# Patient Record
Sex: FEMALE | Race: WHITE
[De-identification: names, ages, dates, MRNs, and addresses within clinical notes are randomized per-mention and may not be internally consistent; named-entity substitution may affect disease eponyms.]

---

## 2020-03-20 ENCOUNTER — HOSPITAL ENCOUNTER (OUTPATIENT)
Dept: HOSPITAL 95 - MOI US | Age: 85
Discharge: HOME | End: 2020-03-20
Attending: INTERNAL MEDICINE
Payer: MEDICARE

## 2020-03-20 DIAGNOSIS — R59.0: Primary | ICD-10-CM

## 2020-03-20 PROCEDURE — A4648 IMPLANTABLE TISSUE MARKER: HCPCS

## 2020-04-16 ENCOUNTER — HOSPITAL ENCOUNTER (OUTPATIENT)
Dept: HOSPITAL 95 - ORSCMMR | Age: 85
Discharge: HOME | End: 2020-04-16
Attending: SURGERY
Payer: MEDICARE

## 2020-04-16 VITALS — BODY MASS INDEX: 29.78 KG/M2 | WEIGHT: 201.06 LBS | HEIGHT: 69.02 IN

## 2020-04-16 DIAGNOSIS — R59.0: Primary | ICD-10-CM

## 2020-04-16 DIAGNOSIS — E78.5: ICD-10-CM

## 2020-04-16 DIAGNOSIS — Z79.899: ICD-10-CM

## 2020-04-16 DIAGNOSIS — I48.91: ICD-10-CM

## 2020-04-16 DIAGNOSIS — I10: ICD-10-CM

## 2020-04-16 DIAGNOSIS — E66.9: ICD-10-CM

## 2020-04-16 DIAGNOSIS — M06.9: ICD-10-CM

## 2020-04-16 DIAGNOSIS — G47.33: ICD-10-CM

## 2020-04-16 DIAGNOSIS — Z79.01: ICD-10-CM

## 2020-04-16 DIAGNOSIS — Z87.891: ICD-10-CM

## 2020-04-16 PROCEDURE — 07B50ZX EXCISION OF RIGHT AXILLARY LYMPHATIC, OPEN APPROACH, DIAGNOSTIC: ICD-10-PCS | Performed by: SURGERY

## 2020-04-16 NOTE — NUR
Ambulatory in Day Surgery.  History, Chart, Medications and Allergies reviewed
before start of procedure.Lungs clear T/O to Auscultation.  Patient confirms
NPO status and agrees with scheduled surgery.  Pre-Op teaching done. Pt
verbalizes understanding.  Patient States Post-Procedure ride home has been
arranged.

## 2020-04-28 ENCOUNTER — HOSPITAL ENCOUNTER (OUTPATIENT)
Dept: HOSPITAL 95 - LAB SHORT | Age: 85
Discharge: HOME | End: 2020-04-28
Attending: FAMILY MEDICINE
Payer: MEDICARE

## 2020-04-28 DIAGNOSIS — J02.9: Primary | ICD-10-CM

## 2020-05-14 ENCOUNTER — HOSPITAL ENCOUNTER (OUTPATIENT)
Dept: HOSPITAL 95 - LAB SHORT | Age: 85
Discharge: HOME | End: 2020-05-14
Attending: INTERNAL MEDICINE
Payer: MEDICARE

## 2020-05-14 DIAGNOSIS — R53.83: ICD-10-CM

## 2020-05-14 DIAGNOSIS — D50.9: Primary | ICD-10-CM

## 2020-05-14 DIAGNOSIS — R53.81: ICD-10-CM

## 2020-05-14 LAB
ALBUMIN SERPL BCP-MCNC: 2.6 G/DL (ref 3.4–5)
ALBUMIN/GLOB SERPL: 0.6 {RATIO} (ref 0.8–1.8)
ALT SERPL W P-5'-P-CCNC: 60 U/L (ref 12–78)
ANION GAP SERPL CALCULATED.4IONS-SCNC: 9 MMOL/L (ref 6–16)
AST SERPL W P-5'-P-CCNC: 42 U/L (ref 12–37)
BILIRUB SERPL-MCNC: 0.3 MG/DL (ref 0.1–1)
BUN SERPL-MCNC: 21 MG/DL (ref 8–24)
CALCIUM SERPL-MCNC: 8.5 MG/DL (ref 8.5–10.1)
CHLORIDE SERPL-SCNC: 103 MMOL/L (ref 98–108)
CO2 SERPL-SCNC: 25 MMOL/L (ref 21–32)
CREAT SERPL-MCNC: 0.82 MG/DL (ref 0.4–1)
GLOBULIN SER CALC-MCNC: 4.4 G/DL (ref 2.2–4)
GLUCOSE SERPL-MCNC: 184 MG/DL (ref 70–99)
POTASSIUM SERPL-SCNC: 4.1 MMOL/L (ref 3.5–5.5)
PROT SERPL-MCNC: 7 G/DL (ref 6.4–8.2)
SODIUM SERPL-SCNC: 137 MMOL/L (ref 136–145)

## 2021-03-11 ENCOUNTER — HOSPITAL ENCOUNTER (EMERGENCY)
Dept: HOSPITAL 95 - ER | Age: 86
Discharge: HOME | End: 2021-03-11
Payer: MEDICARE

## 2021-03-11 VITALS — HEIGHT: 66 IN | BODY MASS INDEX: 31.34 KG/M2 | WEIGHT: 195 LBS

## 2021-03-11 DIAGNOSIS — Z79.01: ICD-10-CM

## 2021-03-11 DIAGNOSIS — K59.09: Primary | ICD-10-CM

## 2021-03-11 DIAGNOSIS — Z79.899: ICD-10-CM

## 2021-03-11 DIAGNOSIS — K64.4: ICD-10-CM

## 2021-03-11 LAB
ALBUMIN SERPL BCP-MCNC: 2.9 G/DL (ref 3.4–5)
ALBUMIN/GLOB SERPL: 0.7 {RATIO} (ref 0.8–1.8)
ALT SERPL W P-5'-P-CCNC: 13 U/L (ref 12–78)
ANION GAP SERPL CALCULATED.4IONS-SCNC: 10 MMOL/L (ref 6–16)
AST SERPL W P-5'-P-CCNC: 20 U/L (ref 12–37)
BASOPHILS # BLD AUTO: 0.07 K/MM3 (ref 0–0.23)
BASOPHILS NFR BLD AUTO: 1 % (ref 0–2)
BILIRUB SERPL-MCNC: 0.5 MG/DL (ref 0.1–1)
BUN SERPL-MCNC: 35 MG/DL (ref 8–24)
CALCIUM SERPL-MCNC: 9 MG/DL (ref 8.5–10.1)
CHLORIDE SERPL-SCNC: 105 MMOL/L (ref 98–108)
CO2 SERPL-SCNC: 22 MMOL/L (ref 21–32)
CREAT SERPL-MCNC: 1.59 MG/DL (ref 0.4–1)
DEPRECATED RDW RBC AUTO: 63.9 FL (ref 35.1–46.3)
EOSINOPHIL # BLD AUTO: 0.15 K/MM3 (ref 0–0.68)
EOSINOPHIL NFR BLD AUTO: 2 % (ref 0–6)
ERYTHROCYTE [DISTWIDTH] IN BLOOD BY AUTOMATED COUNT: 19.7 % (ref 11.7–14.2)
GLOBULIN SER CALC-MCNC: 4.4 G/DL (ref 2.2–4)
GLUCOSE SERPL-MCNC: 127 MG/DL (ref 70–99)
HCT VFR BLD AUTO: 35.2 % (ref 33–51)
HGB BLD-MCNC: 10.8 G/DL (ref 11.5–16)
IMM GRANULOCYTES # BLD AUTO: 0.03 K/MM3 (ref 0–0.1)
IMM GRANULOCYTES NFR BLD AUTO: 0 % (ref 0–1)
LYMPHOCYTES # BLD AUTO: 1.75 K/MM3 (ref 0.84–5.2)
LYMPHOCYTES NFR BLD AUTO: 23 % (ref 21–46)
MCHC RBC AUTO-ENTMCNC: 30.7 G/DL (ref 31.5–36.5)
MCV RBC AUTO: 93 FL (ref 80–100)
MONOCYTES # BLD AUTO: 0.56 K/MM3 (ref 0.16–1.47)
MONOCYTES NFR BLD AUTO: 8 % (ref 4–13)
NEUTROPHILS # BLD AUTO: 4.94 K/MM3 (ref 1.96–9.15)
NEUTROPHILS NFR BLD AUTO: 66 % (ref 41–73)
NRBC # BLD AUTO: 0 K/MM3 (ref 0–0.02)
NRBC BLD AUTO-RTO: 0 /100 WBC (ref 0–0.2)
PLATELET # BLD AUTO: 300 K/MM3 (ref 150–400)
POTASSIUM SERPL-SCNC: 4.6 MMOL/L (ref 3.5–5.5)
PROT SERPL-MCNC: 7.3 G/DL (ref 6.4–8.2)
SODIUM SERPL-SCNC: 137 MMOL/L (ref 136–145)

## 2021-03-11 PROCEDURE — A9270 NON-COVERED ITEM OR SERVICE: HCPCS

## 2021-05-04 ENCOUNTER — HOSPITAL ENCOUNTER (OUTPATIENT)
Dept: HOSPITAL 95 - LAB SHORT | Age: 86
End: 2021-05-04
Attending: FAMILY MEDICINE
Payer: MEDICARE

## 2021-05-04 DIAGNOSIS — N39.0: Primary | ICD-10-CM

## 2021-05-10 ENCOUNTER — HOSPITAL ENCOUNTER (INPATIENT)
Dept: HOSPITAL 95 - ER | Age: 86
DRG: 871 | End: 2021-05-10
Attending: HOSPITALIST | Admitting: HOSPITALIST
Payer: MEDICARE

## 2021-05-10 VITALS — HEIGHT: 67 IN | BODY MASS INDEX: 27.8 KG/M2 | WEIGHT: 177.12 LBS

## 2021-05-10 DIAGNOSIS — Z91.048: ICD-10-CM

## 2021-05-10 DIAGNOSIS — A41.02: Primary | ICD-10-CM

## 2021-05-10 DIAGNOSIS — G92: ICD-10-CM

## 2021-05-10 DIAGNOSIS — I11.0: ICD-10-CM

## 2021-05-10 DIAGNOSIS — M06.9: ICD-10-CM

## 2021-05-10 DIAGNOSIS — N39.0: ICD-10-CM

## 2021-05-10 DIAGNOSIS — A41.81: ICD-10-CM

## 2021-05-10 DIAGNOSIS — E87.2: ICD-10-CM

## 2021-05-10 DIAGNOSIS — Z79.899: ICD-10-CM

## 2021-05-10 DIAGNOSIS — Z20.822: ICD-10-CM

## 2021-05-10 DIAGNOSIS — J69.0: ICD-10-CM

## 2021-05-10 DIAGNOSIS — Z85.3: ICD-10-CM

## 2021-05-10 DIAGNOSIS — Z51.5: ICD-10-CM

## 2021-05-10 DIAGNOSIS — E03.9: ICD-10-CM

## 2021-05-10 DIAGNOSIS — G47.30: ICD-10-CM

## 2021-05-10 DIAGNOSIS — Z66: ICD-10-CM

## 2021-05-10 DIAGNOSIS — K59.00: ICD-10-CM

## 2021-05-10 DIAGNOSIS — I48.91: ICD-10-CM

## 2021-05-10 DIAGNOSIS — N17.9: ICD-10-CM

## 2021-05-10 DIAGNOSIS — Z96.653: ICD-10-CM

## 2021-05-10 DIAGNOSIS — N18.30: ICD-10-CM

## 2021-05-10 DIAGNOSIS — Z87.891: ICD-10-CM

## 2021-05-10 DIAGNOSIS — Z98.890: ICD-10-CM

## 2021-05-10 DIAGNOSIS — R65.21: ICD-10-CM

## 2021-05-10 DIAGNOSIS — I50.42: ICD-10-CM

## 2021-05-10 LAB
ALBUMIN SERPL BCP-MCNC: 2.5 G/DL (ref 3.4–5)
ALBUMIN/GLOB SERPL: 0.5 {RATIO} (ref 0.8–1.8)
ALT SERPL W P-5'-P-CCNC: 18 U/L (ref 12–78)
ANION GAP SERPL CALCULATED.4IONS-SCNC: 21 MMOL/L (ref 6–16)
AST SERPL W P-5'-P-CCNC: 38 U/L (ref 12–37)
BASOPHILS # BLD: 0.33 K/MM3 (ref 0–0.23)
BASOPHILS NFR BLD: 3 % (ref 0–2)
BILIRUB SERPL-MCNC: 0.7 MG/DL (ref 0.1–1)
BILIRUB UR QL STRIP: (no result)
BUN SERPL-MCNC: 35 MG/DL (ref 8–24)
CALCIUM SERPL-MCNC: 8.8 MG/DL (ref 8.5–10.1)
CHLORIDE SERPL-SCNC: 106 MMOL/L (ref 98–108)
CO2 SERPL-SCNC: 14 MMOL/L (ref 21–32)
CREAT SERPL-MCNC: 2.46 MG/DL (ref 0.4–1)
DEPRECATED RDW RBC AUTO: 58.7 FL (ref 35.1–46.3)
EOSINOPHIL # BLD: 0 K/MM3 (ref 0–0.68)
EOSINOPHIL NFR BLD: 0 % (ref 0–6)
ERYTHROCYTE [DISTWIDTH] IN BLOOD BY AUTOMATED COUNT: 17.4 % (ref 11.7–14.2)
GLOBULIN SER CALC-MCNC: 5 G/DL (ref 2.2–4)
GLUCOSE SERPL-MCNC: 196 MG/DL (ref 70–99)
HCT VFR BLD AUTO: 41.7 % (ref 33–51)
HGB BLD-MCNC: 12.1 G/DL (ref 11.5–16)
KETONES UR STRIP-MCNC: (no result) MG/DL
LEUKOCYTE ESTERASE UR QL STRIP: (no result)
LYMPHOCYTES # BLD: 2.54 K/MM3 (ref 0.84–5.2)
LYMPHOCYTES NFR BLD: 23 % (ref 21–46)
MAGNESIUM SERPL-MCNC: 2.3 MG/DL (ref 1.6–2.4)
MAGNESIUM SERPL-MCNC: 2.8 MG/DL (ref 1.6–2.4)
MCHC RBC AUTO-ENTMCNC: 29 G/DL (ref 31.5–36.5)
MCV RBC AUTO: 93 FL (ref 80–100)
MONOCYTES # BLD: 0.44 K/MM3 (ref 0.16–1.47)
MONOCYTES NFR BLD: 4 % (ref 4–13)
MYELOCYTES # BLD MANUAL: 0.11 K/MM3 (ref 0–0)
MYELOCYTES NFR BLD MANUAL: 1 % (ref 0–0)
NEUTS BAND NFR BLD MANUAL: 5 % (ref 0–8)
NEUTS SEG # BLD MANUAL: 7.52 K/MM3 (ref 1.96–9.15)
NEUTS SEG NFR BLD MANUAL: 63 % (ref 41–73)
NRBC # BLD AUTO: 0.02 K/MM3 (ref 0–0.02)
NRBC BLD AUTO-RTO: 0.2 /100 WBC (ref 0–0.2)
PH BLDA: 7.31 [PH] (ref 7.35–7.45)
PHOSPHATE SERPL-MCNC: 8.7 MG/DL (ref 2.5–4.9)
PLASMA CELLS # BLD MANUAL: 0.11 K/MM3 (ref 0–0)
PLASMA CELLS NFR BLD: 1 % (ref 0–0)
PLATELET # BLD AUTO: 309 K/MM3 (ref 150–400)
POTASSIUM SERPL-SCNC: 5 MMOL/L (ref 3.5–5.5)
POTASSIUM SERPL-SCNC: 5.4 MMOL/L (ref 3.5–5.5)
PROT SERPL-MCNC: 7.5 G/DL (ref 6.4–8.2)
PROT UR STRIP-MCNC: (no result) MG/DL
RBC #/AREA URNS HPF: (no result) /HPF (ref 0–2)
SODIUM SERPL-SCNC: 141 MMOL/L (ref 136–145)
SP GR SPEC: 1.02 (ref 1–1.02)
TOTAL CELLS COUNTED BLD: 100
TROPONIN I SERPL-MCNC: 0.02 NG/ML (ref 0–0.04)
TROPONIN I SERPL-MCNC: 0.63 NG/ML (ref 0–0.04)
TSH SERPL DL<=0.005 MIU/L-ACNC: 1.28 UIU/ML (ref 0.36–4.8)
UROBILINOGEN UR STRIP-MCNC: (no result) MG/DL
WBC #/AREA URNS HPF: (no result) /HPF (ref 0–5)

## 2021-05-10 PROCEDURE — 06HY33Z INSERTION OF INFUSION DEVICE INTO LOWER VEIN, PERCUTANEOUS APPROACH: ICD-10-PCS | Performed by: EMERGENCY MEDICINE

## 2021-05-10 PROCEDURE — 3E033XZ INTRODUCTION OF VASOPRESSOR INTO PERIPHERAL VEIN, PERCUTANEOUS APPROACH: ICD-10-PCS | Performed by: HOSPITALIST

## 2021-05-10 PROCEDURE — C1751 CATH, INF, PER/CENT/MIDLINE: HCPCS

## 2021-05-10 PROCEDURE — A9270 NON-COVERED ITEM OR SERVICE: HCPCS

## 2021-05-10 PROCEDURE — U0004 COV-19 TEST NON-CDC HGH THRU: HCPCS

## 2021-05-10 PROCEDURE — C8929 TTE W OR WO FOL WCON,DOPPLER: HCPCS

## 2021-05-10 NOTE — NUR
Spiritual care note:
 
Present with this family throughout the day.  Provided gentle ,
education, and spiritual direction to good effect.  I was with dtrNeyda, at
bedside at [facility].  Prayer provided.  More family arriving throughout the next
couple hours.  Neyda tearful, but appropriate.  Nitesh passed peacefully thanks
to great nursing care and attention.   services will remain available.

## 2021-05-10 NOTE — NUR
PT ADMITTED TO ICU2 FROM ER. UPON ARRIVAL PT'S SBP NOTED TO BE IN 70'S.
MONITOR SHOWED PT TO BE IN A-FIB RVR WITH RATES 120'S-130'S. DR HALL WAS
CONSULTED BY HOSPITALIST TEAM FOR CRITICAL CARE. LEVOPHED GTT ORDERED AND
STARTED WITH IVF BOLUS. O2 AT 4L VIA NC UPON ARRIVAL. DIFFICULTY GETTING CLEAR
SPO2 READING ON MONITOR. PT'S WHOLE LEFT ARM BRUISED. SKIN TEARS NOTED TO LEFT
ELBOW AND LEFT HAND, CLEAR DRESSING COVERING SKIN TEARS. RIGHT FEMORAL CENTRAL
LINE IN PLACE, BLEEDING AROUND INSERTION SITE. PRESSURE APPLIED TO SITE TO
HELP WITH BLEEDING. PITTS IN PLACE WITH VERY LITTLE DARK ANSON URINE. FAMILY
AT BEDSIDE WITH PT. PT ORIENTED TO SELF AND FAMILY, FOLLOWS SIMPLE COMMANDS.
PT VERY RESTLESS IN BED.

## 2021-05-10 NOTE — NUR
AT 1730 PT'S HR STARTED TO DECREASED FROM 120'S TO LOW 70'S. DR HALL WAS
NOTIFIED AND ARRIVED TO BEDSIDE. DISCUSSED WITH FAMILY POSSIBILITIES OF
COMFORT CARE. FAMILY WAS ATTEMPTING TO GET FAMILY TO BEDSIDE THAT WAS FLYING
INTO [place]. ORDERS RECEIVED FOR DOPAMINE GTT AND INITIATED. CB FROM
PALLITIVE CARE NOTIFIED OF PT'S CHANGE AND ARRIVED TO BEDSIDE. PT'S DAUGHTER
REMAINS AT BEDSIDE.

## 2021-05-10 NOTE — NUR
REASSESSMENT
PT REMAINS RESTLESS/AGITATED IT BED. LEVOPHED INFUSING TO MAINTAIN BP.
PRECEDEX INFUSING TO HELP CALM PT. PT WAS C/O ABD DISCOMFORT. AFTER DR HALL
NOTIFIED, ABD CT ORDERED AND OBTAINED. PITTS OUTPUT REMAINS VERY LOW TO NONE.
DISCUSSED WITH DR HALL. CENTRAL LINE DRESSING CHANGED AND PRESSURE APPLIED.
CONTINUES TO OOZ BLOOD AT INSERTION SITE. MONITOR SHOWS PT TO BE IN A-FIB RVR.

## 2021-05-10 NOTE — NUR
CARE ASSUMED
PT PASSED BEFORE SHIFT CHANGE. TOD 1900. FAMILY AT BEDSIDE. AWAITING OTHER
FAMILY TO ARRIVE. CONTINUE TO ASSIST FAMILY AS NEEDED.

## 2021-05-10 NOTE — NUR
DR HALL CALLED TIME OF DEATH AT 1900. ALL GTTS STOPPED. FAMILY AT BEDSIDE
WHILE PT PASSED. TEREASA FROM PALLITIVE CARE AT BEDSIDE.
 
HARMONY, 2ND YEAR NURSING STUDENT WORKED WITH PRIMARY RN TODAY. ALL CHARTING
DONE BY STUDENT REVIEWED AND VERIFIED BY PRIMARY RN. PRIMARY RN WITH STUDENT
WHEN MEDICATIONS WERE ADMINISTERED.

## 2021-05-10 NOTE — NUR
REASSESSMENT
PT LESS RESTLESS, MAXED OUT ON PRECEDEX. LEVOPHED AND VASOPRESSIN INFUSING TO
MAINTAIN BP. ADDITIONAL SMALL IVF BOLUS GIVEN PER ORDERS. URINARY OUTPUT
REMAINS LOW TO NONE. FAMILY AT BEDSIDE. EDUCATED FAMILY ON SEVERITY OF PT'S
ILLNESS. SEE FLOWSHEETS FOR MED TITRATIONS.